# Patient Record
Sex: MALE | Race: WHITE | NOT HISPANIC OR LATINO | Employment: FULL TIME | URBAN - METROPOLITAN AREA
[De-identification: names, ages, dates, MRNs, and addresses within clinical notes are randomized per-mention and may not be internally consistent; named-entity substitution may affect disease eponyms.]

---

## 2024-06-05 ENCOUNTER — OFFICE VISIT (OUTPATIENT)
Dept: URGENT CARE | Facility: CLINIC | Age: 39
End: 2024-06-05
Payer: COMMERCIAL

## 2024-06-05 ENCOUNTER — APPOINTMENT (OUTPATIENT)
Dept: RADIOLOGY | Facility: CLINIC | Age: 39
End: 2024-06-05
Payer: COMMERCIAL

## 2024-06-05 VITALS
OXYGEN SATURATION: 98 % | RESPIRATION RATE: 14 BRPM | HEIGHT: 72 IN | DIASTOLIC BLOOD PRESSURE: 90 MMHG | BODY MASS INDEX: 25.73 KG/M2 | TEMPERATURE: 98.2 F | WEIGHT: 190 LBS | HEART RATE: 70 BPM | SYSTOLIC BLOOD PRESSURE: 136 MMHG

## 2024-06-05 DIAGNOSIS — R05.8 PRODUCTIVE COUGH: Primary | ICD-10-CM

## 2024-06-05 PROCEDURE — 1036F TOBACCO NON-USER: CPT | Performed by: PHYSICIAN ASSISTANT

## 2024-06-05 PROCEDURE — 99203 OFFICE O/P NEW LOW 30 MIN: CPT | Performed by: PHYSICIAN ASSISTANT

## 2024-06-05 RX ORDER — BENZONATATE 100 MG/1
CAPSULE ORAL
COMMUNITY
Start: 2024-05-28

## 2024-06-05 RX ORDER — AZITHROMYCIN 250 MG/1
TABLET, FILM COATED ORAL
Qty: 6 TABLET | Refills: 0 | Status: SHIPPED | OUTPATIENT
Start: 2024-06-05 | End: 2024-06-10

## 2024-06-05 RX ORDER — ALBUTEROL SULFATE 90 UG/1
AEROSOL, METERED RESPIRATORY (INHALATION)
COMMUNITY
Start: 2024-05-28

## 2024-06-05 ASSESSMENT — PAIN SCALES - GENERAL: PAINLEVEL: 5

## 2024-06-05 NOTE — PATIENT INSTRUCTIONS
Assessment/Plan   Problem List Items Addressed This Visit       Productive cough - Primary    Relevant Orders    XR chest 2 views      Patient declines chest x-ray secondary to having high deductible health plan patient is concerned about the cost  Discussed with patient the persistent fever associated with productive cough does raise concerns for bacterial lower respiratory tract infection.  Lungs however are clear and auscultation at this time.  Azithromycin sent for coverage recommending continued use of albuterol and benzonatate for the cough  If symptoms persist or worsen I would recommend patient return for chest x-ray if there is any onset of chest pain or shortness of breath patient should be evaluated at the emergency room promptly

## 2024-06-05 NOTE — PROGRESS NOTES
Subjective   Patient ID: Tor Ariza is a 39 y.o. male who presents for Cough (Productive cough and fever (100) x2 weeks. Seen at [facility] on 05/28/2024 and treated with albuterol and tessalon with no relief. Negative strep and COVID).  Patient is here with complaints of cough ongoing for 2 weeks.  Was evaluated at [facility] a week ago prescribed Tessalon Perles as well as albuterol and notes that symptoms have not improved.  He does note that he has had a low-grade temp over the course of the last 2 weeks that has responded to Tylenol and Advil but continues to return.  He denies any associated nausea vomiting diarrhea chest pain or shortness of breath.  He does note that the cough is productive of yellow sputum especially in the mornings    The remainder of the systems were reviewed and are negative unless noted above        Objective   /90   Pulse 70   Temp 36.8 °C (98.2 °F) (Temporal)   Resp 14   Ht 1.829 m (6')   Wt 86.2 kg (190 lb)   SpO2 98%   BMI 25.77 kg/m²   Physical Exam  Constitutional:       General: He is not in acute distress.     Appearance: Normal appearance. He is not ill-appearing, toxic-appearing or diaphoretic.   HENT:      Head: Normocephalic and atraumatic.      Right Ear: Tympanic membrane and ear canal normal.      Left Ear: Tympanic membrane and ear canal normal.      Mouth/Throat:      Mouth: Mucous membranes are moist.      Pharynx: Oropharynx is clear. No posterior oropharyngeal erythema.   Eyes:      Conjunctiva/sclera: Conjunctivae normal.   Cardiovascular:      Rate and Rhythm: Normal rate and regular rhythm.      Heart sounds: No murmur heard.  Pulmonary:      Effort: Pulmonary effort is normal. No respiratory distress.      Breath sounds: Normal breath sounds. No wheezing.   Musculoskeletal:         General: No swelling, tenderness, deformity or signs of injury. Normal range of motion.      Cervical back: Normal range of motion and neck supple.    Skin:     General: Skin is warm and dry.      Findings: No erythema or rash.   Neurological:      General: No focal deficit present.      Mental Status: He is alert and oriented to person, place, and time.      Gait: Gait normal.         Assessment/Plan   Problem List Items Addressed This Visit       Productive cough - Primary    Relevant Orders    XR chest 2 views      Patient declines chest x-ray secondary to having high deductible health plan patient is concerned about the cost  Discussed with patient the persistent fever associated with productive cough does raise concerns for bacterial lower respiratory tract infection.  Lungs however are clear and auscultation at this time.  Azithromycin sent for coverage recommending continued use of albuterol and benzonatate for the cough  If symptoms persist or worsen I would recommend patient return for chest x-ray if there is any onset of chest pain or shortness of breath patient should be evaluated at the emergency room promptly